# Patient Record
Sex: MALE | Race: WHITE
[De-identification: names, ages, dates, MRNs, and addresses within clinical notes are randomized per-mention and may not be internally consistent; named-entity substitution may affect disease eponyms.]

---

## 2023-01-17 ENCOUNTER — HOSPITAL ENCOUNTER (EMERGENCY)
Dept: HOSPITAL 26 - MED | Age: 48
Discharge: HOME | End: 2023-01-17
Payer: MEDICAID

## 2023-01-17 VITALS — BODY MASS INDEX: 29.62 KG/M2 | WEIGHT: 200 LBS | HEIGHT: 69 IN

## 2023-01-17 VITALS — DIASTOLIC BLOOD PRESSURE: 75 MMHG | SYSTOLIC BLOOD PRESSURE: 114 MMHG

## 2023-01-17 DIAGNOSIS — F12.90: ICD-10-CM

## 2023-01-17 DIAGNOSIS — M79.671: Primary | ICD-10-CM

## 2023-01-17 DIAGNOSIS — Z79.899: ICD-10-CM

## 2023-01-17 DIAGNOSIS — M79.672: ICD-10-CM

## 2023-01-17 NOTE — NUR
PATIENT BIB [place] [employer]. PATIENT EXAMINED BY ASIYA MITCHELL. PATIENT 
MEDICALLY CLEARED AND RELEASED IN CUSTODY IN STABLE CONDITION. ORIGINAL 
PRE-BOOK FORM GIVEN TO OFFICER CHERISE.

## 2023-01-17 NOTE — NUR
47/M [facility] PD AS PREBOOK FOR BILATERAL FEET PAIN. PATIENT REPORTS FEET 
HURT FROM "WALKING IN THE RAIN WITH NO SOCKS." DENIES INJURY OR TRAUMA